# Patient Record
Sex: FEMALE | Race: WHITE | NOT HISPANIC OR LATINO | URBAN - NONMETROPOLITAN AREA
[De-identification: names, ages, dates, MRNs, and addresses within clinical notes are randomized per-mention and may not be internally consistent; named-entity substitution may affect disease eponyms.]

---

## 2019-02-21 ENCOUNTER — IMPORTED ENCOUNTER (OUTPATIENT)
Dept: URBAN - NONMETROPOLITAN AREA CLINIC 1 | Facility: CLINIC | Age: 69
End: 2019-02-21

## 2019-02-21 PROCEDURE — 92015 DETERMINE REFRACTIVE STATE: CPT

## 2019-02-21 PROCEDURE — 92014 COMPRE OPH EXAM EST PT 1/>: CPT

## 2019-02-21 NOTE — PATIENT DISCUSSION
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 BF rx given last visit - noticed much improvement.; Dr's Notes: ""Mike Clarke.""  Has three grand children. "

## 2019-10-24 ENCOUNTER — IMPORTED ENCOUNTER (OUTPATIENT)
Dept: URBAN - NONMETROPOLITAN AREA CLINIC 1 | Facility: CLINIC | Age: 69
End: 2019-10-24

## 2019-10-24 PROBLEM — H02.834: Noted: 2019-10-24

## 2019-10-24 PROBLEM — B02.30: Noted: 2019-10-24

## 2019-10-24 PROBLEM — D31.32: Noted: 2019-10-24

## 2019-10-24 PROBLEM — H52.03: Noted: 2019-10-24

## 2019-10-24 PROBLEM — H52.4: Noted: 2019-10-24

## 2019-10-24 PROBLEM — H02.831: Noted: 2019-10-24

## 2019-10-24 PROCEDURE — 92012 INTRM OPH EXAM EST PATIENT: CPT

## 2019-10-24 NOTE — PATIENT DISCUSSION
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 BF rx given last visit - noticed much improvement. 10/24/2019  Has a single isolated red lesion temporal to outer canthus LE. Use valacyclovir 1000mg tid as rx'd by [facility]gent [facility]. Use bacitracin ointment tid to cover lesion. Do not rub. RTC prn.; 's Notes: ""Johnathan Ashley.""  Has three grand children. "

## 2021-02-03 ENCOUNTER — IMPORTED ENCOUNTER (OUTPATIENT)
Dept: URBAN - NONMETROPOLITAN AREA CLINIC 1 | Facility: CLINIC | Age: 71
End: 2021-02-03

## 2021-02-03 PROBLEM — D31.32: Noted: 2021-02-03

## 2021-02-03 PROBLEM — H02.834: Noted: 2021-02-03

## 2021-02-03 PROBLEM — H52.03: Noted: 2021-02-03

## 2021-02-03 PROBLEM — H52.4: Noted: 2021-02-03

## 2021-02-03 PROBLEM — H02.831: Noted: 2021-02-03

## 2021-02-03 PROCEDURE — 92014 COMPRE OPH EXAM EST PT 1/>: CPT

## 2021-02-03 NOTE — PATIENT DISCUSSION
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 monitor for progressionPresbyopia-Discussed diagnosis with patient. Updated spec Rx given. Recommend lens that will provide comfort as well as protect safety and health of eyes. dermatochalasis oumonitor for progression; 's Notes: ""Tawanda Blakefrancis.""  Has three grand children. "

## 2022-04-09 ASSESSMENT — VISUAL ACUITY
OD_SC: 20/20
OD_SC: 20/25
OD_SC: 20/20
OS_SC: 20/20
OU_CC: 20/20
OU_CC: J1+
OS_SC: 20/20
OS_SC: 20/20

## 2022-04-09 ASSESSMENT — TONOMETRY
OS_IOP_MMHG: 18
OD_IOP_MMHG: 18
OD_IOP_MMHG: 18
OS_IOP_MMHG: 18